# Patient Record
Sex: MALE | Race: WHITE | HISPANIC OR LATINO | URBAN - NONMETROPOLITAN AREA
[De-identification: names, ages, dates, MRNs, and addresses within clinical notes are randomized per-mention and may not be internally consistent; named-entity substitution may affect disease eponyms.]

---

## 2020-01-08 ENCOUNTER — OFFICE VISIT (OUTPATIENT)
Dept: URBAN - NONMETROPOLITAN AREA CLINIC 18 | Facility: CLINIC | Age: 35
End: 2020-01-08
Payer: COMMERCIAL

## 2020-01-08 DIAGNOSIS — H40.013 OPEN ANGLE WITH BORDERLINE FINDINGS, LOW RISK, BILATERAL: ICD-10-CM

## 2020-01-08 PROCEDURE — 92310 CONTACT LENS FITTING OU: CPT | Performed by: OPTOMETRIST

## 2020-01-08 PROCEDURE — 92004 COMPRE OPH EXAM NEW PT 1/>: CPT | Performed by: OPTOMETRIST

## 2020-01-08 ASSESSMENT — INTRAOCULAR PRESSURE
OS: 14
OD: 14

## 2020-01-08 ASSESSMENT — VISUAL ACUITY
OD: 20/20
OS: 20/20

## 2020-01-08 ASSESSMENT — KERATOMETRY
OD: 42.38
OS: 42.38

## 2020-01-08 NOTE — IMPRESSION/PLAN
Impression: Regular astigmatism, bilateral: H52.223. Plan: Reviewed refractive prescription in detail with patient and need for glasses to improve vision. Release spectacle prescription at this time. Contact lens fitting performed today. Patient understands proper care for lenses is necessary to reduce risk of potential longterm complications. No sleeping in contact lenses.

## 2020-01-08 NOTE — IMPRESSION/PLAN
Impression: Open angle with borderline findings, low risk, bilateral: H40.013. Plan: Discussed status of examination with patient. Recommend glaucoma workup with IOP check and ON OCT. Patient understands risk associated with condition and need for monitoring.

## 2020-02-07 ENCOUNTER — OFFICE VISIT (OUTPATIENT)
Dept: URBAN - NONMETROPOLITAN AREA CLINIC 18 | Facility: CLINIC | Age: 35
End: 2020-02-07

## 2020-02-07 DIAGNOSIS — H52.223 REGULAR ASTIGMATISM, BILATERAL: Primary | ICD-10-CM

## 2020-02-07 PROCEDURE — 92310 CONTACT LENS FITTING OU: CPT | Performed by: OPTOMETRIST

## 2022-06-24 ENCOUNTER — OFFICE VISIT (OUTPATIENT)
Dept: URBAN - NONMETROPOLITAN AREA CLINIC 18 | Facility: CLINIC | Age: 37
End: 2022-06-24
Payer: COMMERCIAL

## 2022-06-24 DIAGNOSIS — H52.223 REGULAR ASTIGMATISM, BILATERAL: ICD-10-CM

## 2022-06-24 DIAGNOSIS — H40.013 OPEN ANGLE WITH BORDERLINE FINDINGS, LOW RISK, BILATERAL: Primary | ICD-10-CM

## 2022-06-24 PROCEDURE — 92310 CONTACT LENS FITTING OU: CPT | Performed by: OPTOMETRIST

## 2022-06-24 PROCEDURE — 92014 COMPRE OPH EXAM EST PT 1/>: CPT | Performed by: OPTOMETRIST

## 2022-06-24 ASSESSMENT — VISUAL ACUITY
OD: 20/20
OS: 20/20

## 2022-06-24 ASSESSMENT — INTRAOCULAR PRESSURE
OD: 16
OS: 14

## 2022-06-24 NOTE — IMPRESSION/PLAN
Impression: Open angle with borderline findings, low risk, bilateral: H40.013. Plan: The condition of glaucoma and the difference between having a diagnosis of glaucoma and glaucoma suspect was discussed in detail. A baseline OCT retina nerve fiber analysis , Gonioscopy and Pachymetry are recommended. All of the patient's questions were answered. The potential risk of permanent vision loss from optic nerve changes was discussed and patient understands importance of monitoring and treatment if needed. Initial risk based on CD ratio and mild IOP asymmetry , follow up recommended in  2 months.

## 2022-06-24 NOTE — IMPRESSION/PLAN
Impression: Regular astigmatism, bilateral: H52.223. Plan: Reviewed refractive prescription in detail with patient and ways glasses can be used  to improve vision. Release spectacle prescription at this time. Contact lens fitting performed today. Patient understands proper care for lenses is necessary to reduce risk of potential longterm complications. Discussed with patient to not sleep in contact lenses,  clean daily with contact lenses cleaning solution, no cleaning with tap water, and recommended replacement schedule.

## 2024-03-29 ENCOUNTER — OFFICE VISIT (OUTPATIENT)
Dept: URBAN - METROPOLITAN AREA CLINIC 88 | Facility: CLINIC | Age: 39
End: 2024-03-29
Payer: COMMERCIAL

## 2024-03-29 DIAGNOSIS — H52.223 REGULAR ASTIGMATISM, BILATERAL: Primary | ICD-10-CM

## 2024-03-29 DIAGNOSIS — H40.013 OPEN ANGLE WITH BORDERLINE FINDINGS, LOW RISK, BILATERAL: ICD-10-CM

## 2024-03-29 PROCEDURE — 92014 COMPRE OPH EXAM EST PT 1/>: CPT | Performed by: OPTOMETRIST

## 2024-03-29 ASSESSMENT — INTRAOCULAR PRESSURE
OD: 16
OS: 14

## 2024-03-29 ASSESSMENT — VISUAL ACUITY
OD: 20/20
OS: 20/20

## 2024-04-16 ENCOUNTER — OFFICE VISIT (OUTPATIENT)
Dept: URBAN - METROPOLITAN AREA CLINIC 88 | Facility: CLINIC | Age: 39
End: 2024-04-16
Payer: COMMERCIAL

## 2024-04-16 DIAGNOSIS — H40.013 OPEN ANGLE WITH BORDERLINE FINDINGS, LOW RISK, BILATERAL: Primary | ICD-10-CM

## 2024-04-16 PROCEDURE — 99213 OFFICE O/P EST LOW 20 MIN: CPT | Performed by: OPTOMETRIST

## 2024-04-16 PROCEDURE — 76514 ECHO EXAM OF EYE THICKNESS: CPT | Performed by: OPTOMETRIST

## 2024-04-16 PROCEDURE — 92133 CPTRZD OPH DX IMG PST SGM ON: CPT | Performed by: OPTOMETRIST

## 2024-04-16 ASSESSMENT — INTRAOCULAR PRESSURE
OS: 7
OD: 9